# Patient Record
Sex: MALE | Race: WHITE | NOT HISPANIC OR LATINO | Employment: FULL TIME | URBAN - METROPOLITAN AREA
[De-identification: names, ages, dates, MRNs, and addresses within clinical notes are randomized per-mention and may not be internally consistent; named-entity substitution may affect disease eponyms.]

---

## 2022-12-27 ENCOUNTER — LAB (OUTPATIENT)
Dept: LAB | Facility: HOSPITAL | Age: 42
End: 2022-12-27

## 2022-12-27 ENCOUNTER — OFFICE VISIT (OUTPATIENT)
Dept: FAMILY MEDICINE CLINIC | Facility: CLINIC | Age: 42
End: 2022-12-27

## 2022-12-27 ENCOUNTER — PATIENT ROUNDING (BHMG ONLY) (OUTPATIENT)
Dept: FAMILY MEDICINE CLINIC | Facility: CLINIC | Age: 42
End: 2022-12-27

## 2022-12-27 VITALS
BODY MASS INDEX: 25.31 KG/M2 | RESPIRATION RATE: 20 BRPM | SYSTOLIC BLOOD PRESSURE: 118 MMHG | TEMPERATURE: 98.2 F | OXYGEN SATURATION: 98 % | WEIGHT: 191 LBS | DIASTOLIC BLOOD PRESSURE: 68 MMHG | HEIGHT: 73 IN | HEART RATE: 62 BPM

## 2022-12-27 DIAGNOSIS — D72.829 LEUKOCYTOSIS, UNSPECIFIED TYPE: ICD-10-CM

## 2022-12-27 DIAGNOSIS — D72.829 LEUKOCYTOSIS, UNSPECIFIED TYPE: Primary | ICD-10-CM

## 2022-12-27 LAB
BASOPHILS # BLD AUTO: 0.04 10*3/MM3 (ref 0–0.2)
BASOPHILS NFR BLD AUTO: 0.4 % (ref 0–1.5)
DEPRECATED RDW RBC AUTO: 40.3 FL (ref 37–54)
EOSINOPHIL # BLD AUTO: 0.11 10*3/MM3 (ref 0–0.4)
EOSINOPHIL NFR BLD AUTO: 1.2 % (ref 0.3–6.2)
ERYTHROCYTE [DISTWIDTH] IN BLOOD BY AUTOMATED COUNT: 12.8 % (ref 12.3–15.4)
HCT VFR BLD AUTO: 45.1 % (ref 37.5–51)
HGB BLD-MCNC: 15.5 G/DL (ref 13–17.7)
IMM GRANULOCYTES # BLD AUTO: 0.05 10*3/MM3 (ref 0–0.05)
IMM GRANULOCYTES NFR BLD AUTO: 0.5 % (ref 0–0.5)
LYMPHOCYTES # BLD AUTO: 2.15 10*3/MM3 (ref 0.7–3.1)
LYMPHOCYTES NFR BLD AUTO: 22.7 % (ref 19.6–45.3)
MCH RBC QN AUTO: 29.6 PG (ref 26.6–33)
MCHC RBC AUTO-ENTMCNC: 34.4 G/DL (ref 31.5–35.7)
MCV RBC AUTO: 86.2 FL (ref 79–97)
MONOCYTES # BLD AUTO: 0.78 10*3/MM3 (ref 0.1–0.9)
MONOCYTES NFR BLD AUTO: 8.2 % (ref 5–12)
NEUTROPHILS NFR BLD AUTO: 6.33 10*3/MM3 (ref 1.7–7)
NEUTROPHILS NFR BLD AUTO: 67 % (ref 42.7–76)
NRBC BLD AUTO-RTO: 0 /100 WBC (ref 0–0.2)
PLATELET # BLD AUTO: 260 10*3/MM3 (ref 140–450)
PMV BLD AUTO: 10.6 FL (ref 6–12)
RBC # BLD AUTO: 5.23 10*6/MM3 (ref 4.14–5.8)
WBC NRBC COR # BLD: 9.46 10*3/MM3 (ref 3.4–10.8)

## 2022-12-27 PROCEDURE — 85025 COMPLETE CBC W/AUTO DIFF WBC: CPT

## 2022-12-27 PROCEDURE — 99202 OFFICE O/P NEW SF 15 MIN: CPT | Performed by: NURSE PRACTITIONER

## 2022-12-27 RX ORDER — MELOXICAM 15 MG/1
TABLET ORAL
COMMUNITY
Start: 2022-12-22

## 2022-12-27 RX ORDER — IBUPROFEN 200 MG
200 TABLET ORAL EVERY 6 HOURS PRN
COMMUNITY

## 2022-12-27 NOTE — PROGRESS NOTES
"Chief Complaint  Establish Care (Pt stated went to [facility] last week for L foot pn. Pt stated WBC was elevated that has him concerned. )    Subjective        Jorge Luis Estrada presents to [facility]  History of Present Illness  Pt is here today for ER follow up. He was seen in the ER on 12/21 for foot pain. He reports the foot pain has resolved. He was given pain medication in the ER. He wore a boot for a day and saw a Podiatrist after his ER visit. He did have an elevated WBC in the ER. That is his main reason for being here today.     Objective   Vital Signs:  /68 (BP Location: Left arm, Patient Position: Sitting, Cuff Size: Large Adult)   Pulse 62   Temp 98.2 °F (36.8 °C) (Temporal)   Resp 20   Ht 186 cm (73.23\")   Wt 86.6 kg (191 lb)   SpO2 98%   BMI 25.04 kg/m²   Estimated body mass index is 25.04 kg/m² as calculated from the following:    Height as of this encounter: 186 cm (73.23\").    Weight as of this encounter: 86.6 kg (191 lb).        Physical Exam  Vitals reviewed.   Constitutional:       Appearance: Normal appearance.   HENT:      Nose: Nose normal.      Mouth/Throat:      Mouth: Mucous membranes are moist.      Pharynx: Oropharynx is clear.   Eyes:      Conjunctiva/sclera: Conjunctivae normal.   Cardiovascular:      Rate and Rhythm: Normal rate and regular rhythm.      Heart sounds: Normal heart sounds.   Pulmonary:      Effort: Pulmonary effort is normal.      Breath sounds: Normal breath sounds.   Musculoskeletal:         General: Normal range of motion.      Cervical back: Normal range of motion and neck supple.   Skin:     General: Skin is warm.   Neurological:      Mental Status: He is alert and oriented to person, place, and time.   Psychiatric:         Mood and Affect: Mood normal.         Behavior: Behavior normal.         Thought Content: Thought content normal.        Result Review :                Assessment and Plan   Diagnoses and all orders for " this visit:    1. Leukocytosis, unspecified type (Primary)  -     CBC Auto Differential; Future           Follow Up   Return in about 2 months (around 2/27/2023) for Annual.  Patient was given instructions and counseling regarding his condition or for health maintenance advice. Please see specific information pulled into the AVS if appropriate.

## 2024-12-11 ENCOUNTER — OFFICE VISIT (OUTPATIENT)
Age: 44
End: 2024-12-11
Payer: COMMERCIAL

## 2024-12-11 VITALS
HEART RATE: 75 BPM | HEIGHT: 73 IN | DIASTOLIC BLOOD PRESSURE: 86 MMHG | OXYGEN SATURATION: 97 % | WEIGHT: 191.4 LBS | BODY MASS INDEX: 25.37 KG/M2 | SYSTOLIC BLOOD PRESSURE: 118 MMHG

## 2024-12-11 DIAGNOSIS — R09.81 NASAL CONGESTION: ICD-10-CM

## 2024-12-11 DIAGNOSIS — J01.90 ACUTE NON-RECURRENT SINUSITIS, UNSPECIFIED LOCATION: Primary | ICD-10-CM

## 2024-12-11 LAB
EXPIRATION DATE: NORMAL
FLUAV AG UPPER RESP QL IA.RAPID: NOT DETECTED
FLUBV AG UPPER RESP QL IA.RAPID: NOT DETECTED
INTERNAL CONTROL: NORMAL
Lab: NORMAL
SARS-COV-2 AG UPPER RESP QL IA.RAPID: NOT DETECTED

## 2024-12-11 PROCEDURE — 99213 OFFICE O/P EST LOW 20 MIN: CPT | Performed by: NURSE PRACTITIONER

## 2024-12-11 PROCEDURE — 87428 SARSCOV & INF VIR A&B AG IA: CPT | Performed by: NURSE PRACTITIONER

## 2024-12-11 NOTE — PROGRESS NOTES
Chief Complaint  Nasal Congestion (Sx going on 3 weeks), Shortness of Breath, Runny Eyes, Ear Itching, and Cough    Subjective        Jorge Luis Estrada presents to [facility]  History of Present Illness    History of Present Illness  The patient is a 44-year-old male who presents for evaluation of sinus infection, elevated blood pressure, and carpal tunnel syndrome.    He has been experiencing illness for the past 3 weeks, initially managed with over-the-counter Lilia-[place]. His symptoms improved slightly after 2 to 3 days, allowing him to return to work. However, he subsequently developed rhinorrhea. He also reports an episode of severe nasal discharge yesterday, which he describes as akin to a tear. He experiences pressure behind his eyes, likening it to a heartbeat. He has been alternating between Lilia-[place] and Mucinex, and also took Sudafed and allergy medication. He took Lilia-[place] Sinus Congestion and Pain last night before bed and this morning. He typically takes it at night before bed. He has been consuming large amounts of fluids to flush out his system. He reports yellow nasal discharge. He has been using ear wax cleaning products and taking hot showers to clear his ears, but is unsure if this is contributing to his symptoms. He wakes up with a dry mouth due to mouth breathing. He has been smoking for a couple of years and wonders if this could be contributing to his symptoms.    He reports that his fingers feel cold and tingly, particularly in winter. He suspects he may have carpal tunnel syndrome, although he has not been formally diagnosed. He has been working with his hands for the past 20 years and occasionally experiences pain when squeezing objects. He reports no history of diabetes.    He has been experiencing mild abdominal pain, which he attributes to not eating in the morning.    He has been feeling unwell for several weeks and has been taking Lilia-[place]  "daily. He has been drinking a lot of fluids to try to flush out his system. He has been feeling anxious and clammy. He used to drink heavily but no longer does so. He does not experience significant anxiety currently and feels he can manage it.    SOCIAL HISTORY  The patient admits to smoking.    FAMILY HISTORY  The patient's father is diabetic, which the patient attributes to his weight and smoking history.    MEDICATIONS  Current: Lilia-[place], Mucinex, Sudafed    Results       Results for orders placed or performed in visit on 12/11/24   POCT SARS-CoV-2 + Flu Antigen MEENU    Collection Time: 12/11/24  3:27 PM    Specimen: Swab   Result Value Ref Range    SARS Antigen Not Detected Not Detected, Presumptive Negative    Influenza A Antigen MEENU Not Detected Not Detected    Influenza B Antigen MEENU Not Detected Not Detected    Internal Control Passed Passed    Lot Number [id number]     Expiration Date 2025-10-23          Objective   Vital Signs:  /86   Pulse 75   Ht 186 cm (73.23\")   Wt 86.8 kg (191 lb 6.4 oz)   SpO2 97%   BMI 25.10 kg/m²   Estimated body mass index is 25.1 kg/m² as calculated from the following:    Height as of this encounter: 186 cm (73.23\").    Weight as of this encounter: 86.8 kg (191 lb 6.4 oz).          Physical Exam  Vitals reviewed.   Constitutional:       Appearance: Normal appearance.   HENT:      Right Ear: Tympanic membrane, ear canal and external ear normal.      Left Ear: Tympanic membrane, ear canal and external ear normal.      Nose: Nose normal. Congestion and rhinorrhea present.      Mouth/Throat:      Mouth: Mucous membranes are moist.      Pharynx: Oropharynx is clear.   Eyes:      Conjunctiva/sclera: Conjunctivae normal.   Cardiovascular:      Rate and Rhythm: Normal rate and regular rhythm.      Heart sounds: Normal heart sounds.   Pulmonary:      Effort: Pulmonary effort is normal.      Breath sounds: Normal breath sounds.   Musculoskeletal:         General: Normal range " of motion.      Cervical back: Normal range of motion.   Skin:     General: Skin is warm.   Neurological:      Mental Status: He is alert and oriented to person, place, and time.   Psychiatric:         Mood and Affect: Mood normal.         Behavior: Behavior normal.         Thought Content: Thought content normal.        Result Review :                  Assessment and Plan   Diagnoses and all orders for this visit:    1. Acute non-recurrent sinusitis, unspecified location (Primary)  -     amoxicillin-clavulanate (AUGMENTIN) 875-125 MG per tablet; Take 1 tablet by mouth 2 (Two) Times a Day for 7 days.  Dispense: 14 tablet; Refill: 0    2. Nasal congestion  -     POCT SARS-CoV-2 + Flu Antigen MEENU        Assessment & Plan  1. Sinus Infection.  His symptoms suggest a sinus infection, likely of bacterial origin. He is advised to discontinue the use of decongestants for a few days. An over-the-counter medication, Coricidin HBP, is recommended for symptom management. A prescription for Augmentin has been issued to address the sinus infection. A work note will be provided.    2. Elevated Blood Pressure.  His blood pressure is elevated at 156/102, which may be a side effect of the decongestants he has been taking. His blood pressure was normal during his last visit at 120s/60s. He is advised to stop using decongestants. His blood pressure will be rechecked before he leaves today, and again in 2 weeks. If it remains elevated, further evaluation and management will be considered.    3. Carpal Tunnel Syndrome.  He reports symptoms consistent with carpal tunnel syndrome, including tingling and occasional pain in his fingers, especially during the winter. Further evaluation and management will be considered if symptoms persist.    4. Abdominal Pain.  He reports occasional stomach pain, possibly related to not eating in the morning. This will be further evaluated during his next visit in 2 weeks, along with routine labs.    5.  Anxiety.  He has been feeling anxious and clammy. He used to drink heavily but no longer does so. He does not experience significant anxiety currently and feels he can manage it.    Follow-up  The patient will follow up in 2 weeks.       The following portions of the patient's history were reviewed and updated as appropriate: allergies, current medications, past family history, past medical history, past social history, past surgical history, and problem list.       Follow Up   Return in about 2 weeks (around 12/25/2024) for Annual, Recheck BP.  Patient was given instructions and counseling regarding his condition or for health maintenance advice. Please see specific information pulled into the AVS if appropriate.         Patient or patient representative verbalized consent for the use of Ambient Listening during the visit with  ANDREINA Bess for chart documentation. 12/12/2024  08:32 EST

## 2024-12-27 ENCOUNTER — LAB (OUTPATIENT)
Age: 44
End: 2024-12-27
Payer: COMMERCIAL

## 2024-12-27 ENCOUNTER — OFFICE VISIT (OUTPATIENT)
Age: 44
End: 2024-12-27
Payer: COMMERCIAL

## 2024-12-27 VITALS
HEART RATE: 71 BPM | HEIGHT: 72 IN | OXYGEN SATURATION: 96 % | DIASTOLIC BLOOD PRESSURE: 60 MMHG | BODY MASS INDEX: 25.3 KG/M2 | WEIGHT: 186.8 LBS | SYSTOLIC BLOOD PRESSURE: 102 MMHG

## 2024-12-27 DIAGNOSIS — Z00.00 ANNUAL PHYSICAL EXAM: ICD-10-CM

## 2024-12-27 DIAGNOSIS — Z11.59 ENCOUNTER FOR HEPATITIS C SCREENING TEST FOR LOW RISK PATIENT: ICD-10-CM

## 2024-12-27 DIAGNOSIS — Z00.00 ANNUAL PHYSICAL EXAM: Primary | ICD-10-CM

## 2024-12-27 LAB
ALBUMIN SERPL-MCNC: 4.4 G/DL (ref 3.5–5.2)
ALBUMIN/GLOB SERPL: 1.3 G/DL
ALP SERPL-CCNC: 57 U/L (ref 39–117)
ALT SERPL W P-5'-P-CCNC: 13 U/L (ref 1–41)
ANION GAP SERPL CALCULATED.3IONS-SCNC: 10.5 MMOL/L (ref 5–15)
AST SERPL-CCNC: 21 U/L (ref 1–40)
BILIRUB SERPL-MCNC: 0.4 MG/DL (ref 0–1.2)
BUN SERPL-MCNC: 15 MG/DL (ref 6–20)
BUN/CREAT SERPL: 13.2 (ref 7–25)
CALCIUM SPEC-SCNC: 9.3 MG/DL (ref 8.6–10.5)
CHLORIDE SERPL-SCNC: 105 MMOL/L (ref 98–107)
CHOLEST SERPL-MCNC: 190 MG/DL (ref 0–200)
CO2 SERPL-SCNC: 26.5 MMOL/L (ref 22–29)
CREAT SERPL-MCNC: 1.14 MG/DL (ref 0.76–1.27)
DEPRECATED RDW RBC AUTO: 43.3 FL (ref 37–54)
EGFRCR SERPLBLD CKD-EPI 2021: 80.3 ML/MIN/1.73
ERYTHROCYTE [DISTWIDTH] IN BLOOD BY AUTOMATED COUNT: 13.4 % (ref 12.3–15.4)
GLOBULIN UR ELPH-MCNC: 3.3 GM/DL
GLUCOSE SERPL-MCNC: 72 MG/DL (ref 65–99)
HCT VFR BLD AUTO: 44.5 % (ref 37.5–51)
HCV AB SER QL: NORMAL
HDLC SERPL-MCNC: 49 MG/DL (ref 40–60)
HGB BLD-MCNC: 14.7 G/DL (ref 13–17.7)
LDLC SERPL CALC-MCNC: 121 MG/DL (ref 0–100)
LDLC/HDLC SERPL: 2.42 {RATIO}
MCH RBC QN AUTO: 29.5 PG (ref 26.6–33)
MCHC RBC AUTO-ENTMCNC: 33 G/DL (ref 31.5–35.7)
MCV RBC AUTO: 89.4 FL (ref 79–97)
PLATELET # BLD AUTO: 257 10*3/MM3 (ref 140–450)
PMV BLD AUTO: 10.7 FL (ref 6–12)
POTASSIUM SERPL-SCNC: 4.2 MMOL/L (ref 3.5–5.2)
PROT SERPL-MCNC: 7.7 G/DL (ref 6–8.5)
RBC # BLD AUTO: 4.98 10*6/MM3 (ref 4.14–5.8)
SODIUM SERPL-SCNC: 142 MMOL/L (ref 136–145)
TRIGL SERPL-MCNC: 113 MG/DL (ref 0–150)
VLDLC SERPL-MCNC: 20 MG/DL (ref 5–40)
WBC NRBC COR # BLD AUTO: 8.97 10*3/MM3 (ref 3.4–10.8)

## 2024-12-27 PROCEDURE — 36415 COLL VENOUS BLD VENIPUNCTURE: CPT | Performed by: NURSE PRACTITIONER

## 2024-12-27 PROCEDURE — 85027 COMPLETE CBC AUTOMATED: CPT | Performed by: NURSE PRACTITIONER

## 2024-12-27 PROCEDURE — 99396 PREV VISIT EST AGE 40-64: CPT | Performed by: NURSE PRACTITIONER

## 2024-12-27 PROCEDURE — 80061 LIPID PANEL: CPT | Performed by: NURSE PRACTITIONER

## 2024-12-27 PROCEDURE — 80053 COMPREHEN METABOLIC PANEL: CPT | Performed by: NURSE PRACTITIONER

## 2024-12-27 PROCEDURE — 86803 HEPATITIS C AB TEST: CPT | Performed by: NURSE PRACTITIONER

## 2024-12-27 NOTE — PROGRESS NOTES
"Chief Complaint  Annual Exam    Subjective          Jorge Luis Estrada presents to [facility] for   History of Present Illness  History of Present Illness  The patient is a 44-year-old male who presents for a routine checkup.    He reports no current health issues but acknowledges a slower recovery rate from minor ailments, which he attributes to his age and history of smoking. He has been experiencing mild abdominal discomfort, which he suspects may be due to increased stomach acid production when he skips breakfast. This discomfort typically subsides after consuming water or juice. He also reports muscle soreness, possibly related to a recent bout of coughing and sneezing over the past 3 days. He has not had any constipation or diarrhea.    He has a history of smoking cigarettes from the age of 13 to 34, after which he transitioned to vaping, a habit he is currently attempting to quit.    His last dental visit was approximately 6 months ago for routine cleaning, and he is due for another appointment. He had an eye examination last year due to the onset of blurry vision one morning, which he believes may be indicative of nearsightedness.    His blood pressure was elevated during a previous visit, which he believes may have been due to the use of antihistamines. He has since discontinued their use.    SOCIAL HISTORY  The patient smoked cigarettes from age 13 to 34 and has been vaping since then. He is trying to quit vaping.    FAMILY HISTORY  The patient does not have any known family history of colorectal cancer.    MEDICATIONS  Discontinued: Antihistamines.    IMMUNIZATIONS  The patient is due for a tetanus vaccine.    Objective   Vital Signs:   Vitals:    12/27/24 1529   BP: 102/60   BP Location: Right arm   Patient Position: Sitting   Cuff Size: Adult   Pulse: 71   SpO2: 96%   Weight: 84.7 kg (186 lb 12.8 oz)   Height: 183.9 cm (72.4\")   PainSc: 0-No pain     Body mass index is 25.05 " kg/m².    The following portions of the patient's history were reviewed and updated as appropriate: allergies, current medications, past family history, past medical history, past social history, past surgical history, and problem list.      Physical Exam  Vitals and nursing note reviewed.   Constitutional:       Appearance: Normal appearance.   HENT:      Right Ear: Tympanic membrane, ear canal and external ear normal.      Left Ear: Tympanic membrane, ear canal and external ear normal.      Nose: Nose normal.      Mouth/Throat:      Mouth: Mucous membranes are moist.      Pharynx: Oropharynx is clear.   Eyes:      Conjunctiva/sclera: Conjunctivae normal.      Pupils: Pupils are equal, round, and reactive to light.   Cardiovascular:      Rate and Rhythm: Normal rate and regular rhythm.      Heart sounds: Normal heart sounds.   Pulmonary:      Effort: Pulmonary effort is normal.      Breath sounds: Normal breath sounds.   Abdominal:      General: Bowel sounds are normal.      Palpations: Abdomen is soft.   Musculoskeletal:         General: Normal range of motion.      Cervical back: Normal range of motion and neck supple.   Skin:     General: Skin is warm.   Neurological:      Mental Status: He is alert and oriented to person, place, and time.   Psychiatric:         Mood and Affect: Mood normal.         Behavior: Behavior normal.         Thought Content: Thought content normal.        Result Review :                  There is no immunization history on file for this patient.    Health Maintenance   Topic Date Due    COLORECTAL CANCER SCREENING  Never done    HEPATITIS C SCREENING  Never done    COVID-19 Vaccine (1 - 2024-25 season) 12/29/2024 (Originally 9/1/2024)    INFLUENZA VACCINE  03/31/2025 (Originally 7/1/2024)    TDAP/TD VACCINES (1 - Tdap) 12/27/2025 (Originally 10/4/1997)    BMI FOLLOWUP  12/11/2025    ANNUAL PHYSICAL  12/27/2025    Pneumococcal Vaccine 0-64  Aged Out        Assessment and Plan     Diagnoses and all orders for this visit:    1. Annual physical exam (Primary)  -     CBC (No Diff); Future  -     Comprehensive Metabolic Panel; Future  -     Lipid Panel; Future    2. Encounter for hepatitis C screening test for low risk patient  -     Hepatitis C Antibody; Future        Assessment & Plan  1. Health maintenance.  He is due for a colonoscopy, as he is now 44 years old. The importance of colorectal cancer screening was discussed, including the option of Cologuard as an alternative to colonoscopy. He will discuss this further with his wife and insurance company. He is also due for a tetanus vaccine. The necessity of this vaccine, especially given his occupation and potential exposure to soil-borne bacteria, was explained. A comprehensive lab workup will be ordered today. He will inform the  about the lab work before leaving. If he decides to proceed with the colonoscopy, he can send a message through [facility], and the order will be placed.    2. Smoking cessation.  He has a history of smoking cigarettes from age 13 to 34 and has been vaping since then. He is trying to quit vaping. The benefits of quitting vaping were discussed, and he is encouraged to continue his efforts to quit.    3. Elevated blood pressure.  His blood pressure was within the normal range during this visit. He mentioned a previous instance of elevated blood pressure, which was likely due to taking antihistamines. He was advised to avoid decongestants as they can raise blood pressure.    Follow-up  The patient will follow up in 1 year.    Counseling/anticipatory guidance: Nutrition, physical activity, healthy weight, dental health, mental health, eye exam, immunizations, screenings   - Reviewed vaccinations. Discussed routine labs. Advised pt of need for colonoscopy or cologuard; pt declines at this time. Pt also declines tdap today.      Follow Up   Return in about 1 year (around 12/27/2025) for Annual.  Patient was  given instructions and counseling regarding his condition or for health maintenance advice. Please see specific information pulled into the AVS if appropriate.     Patient or patient representative verbalized consent for the use of Ambient Listening during the visit with  ANDREINA Bess for chart documentation. 12/27/2024  15:52 EST